# Patient Record
Sex: MALE | Race: WHITE | URBAN - METROPOLITAN AREA
[De-identification: names, ages, dates, MRNs, and addresses within clinical notes are randomized per-mention and may not be internally consistent; named-entity substitution may affect disease eponyms.]

---

## 2017-04-17 ENCOUNTER — OFFICE VISIT (OUTPATIENT)
Dept: SLEEP MEDICINE | Facility: CLINIC | Age: 27
End: 2017-04-17
Payer: COMMERCIAL

## 2017-04-17 VITALS
SYSTOLIC BLOOD PRESSURE: 115 MMHG | OXYGEN SATURATION: 97 % | HEART RATE: 75 BPM | HEIGHT: 71 IN | DIASTOLIC BLOOD PRESSURE: 74 MMHG | WEIGHT: 223 LBS | BODY MASS INDEX: 31.22 KG/M2

## 2017-04-17 DIAGNOSIS — R06.09 DOE (DYSPNEA ON EXERTION): ICD-10-CM

## 2017-04-17 DIAGNOSIS — R07.9 CHEST PAIN, UNSPECIFIED TYPE: Primary | ICD-10-CM

## 2017-04-17 PROCEDURE — 99205 OFFICE O/P NEW HI 60 MIN: CPT | Performed by: FAMILY MEDICINE

## 2017-04-17 RX ORDER — TRAZODONE HYDROCHLORIDE 50 MG/1
TABLET, FILM COATED ORAL
COMMUNITY
Start: 2017-04-03

## 2017-04-17 RX ORDER — BUPRENORPHINE HYDROCHLORIDE, NALOXONE HYDROCHLORIDE 8; 2 MG/1; MG/1
FILM, SOLUBLE BUCCAL; SUBLINGUAL
COMMUNITY
Start: 2017-04-05

## 2017-04-17 RX ORDER — GABAPENTIN 600 MG/1
TABLET ORAL
COMMUNITY
Start: 2017-04-15

## 2017-04-17 RX ORDER — PROPRANOLOL HYDROCHLORIDE 10 MG/1
TABLET ORAL
COMMUNITY
Start: 2017-04-16

## 2017-04-17 RX ORDER — BUPRENORPHINE HYDROCHLORIDE AND NALOXONE HYDROCHLORIDE DIHYDRATE 2; .5 MG/1; MG/1
TABLET SUBLINGUAL
COMMUNITY
Start: 2017-03-24

## 2017-04-17 RX ORDER — MIRTAZAPINE 45 MG/1
TABLET, FILM COATED ORAL
COMMUNITY
Start: 2017-04-05

## 2017-04-17 NOTE — MR AVS SNAPSHOT
After Visit Summary   4/17/2017    Vasquez Echevarria    MRN: [id number]           Patient Information     Date Of Birth          1990        Visit Information        Provider Department      4/17/2017 8:30 AM Ralph Almazan MD [facility]        Care Instructions    Here is a summary of our plan and considerations for Mr. Echevarria.    1.)  We will arrange an overnight oximetry (can be done at [facility]) to see if any severe oxygen desaturations.  [place] showed very mild KARLI.    2.)  For the chest pain / cardiac concerns, we will help arrange a transthoracic echocardiogram and try to get the records of his prior EKG to ensure no arrhythmias.    3.)  For his sleep medications, I agree with working on optimizing anxiety treatment.  For now, I would consider increasing the Trazodone to 100 or 150mg, consider increasing the melatonin to 10-15mg.  If felt to be appropriate, also consider starting a tricyclic anti-depressant like doxepin 3-25mg, amitriptyline or nortriptyline.    4.)  Plan to follow up for results of oximetry and echocardiogram.        Follow-ups after your visit        Who to contact     If you have questions or need follow up information about today's clinic visit or your schedule please contact [facility] directly at [phone].  Normal or non-critical lab and imaging results will be communicated to you by MyChart, letter or phone within 4 business days after the clinic has received the results. If you do not hear from us within 7 days, please contact the clinic through MyChart or phone. If you have a critical or abnormal lab result, we will notify you by phone as soon as possible.  Submit refill requests through [employer] or call your pharmacy and they will forward the refill request to us. Please allow 3 business days for your refill to be completed.          Additional Information About Your Visit        [employer]  "Information     Fingerprint lets you send messages to your doctor, view your test results, renew your prescriptions, schedule appointments and more. To sign up, go to [email or web address] . Click on \"Log in\" on the left side of the screen, which will take you to the Welcome page. Then click on \"Sign up Now\" on the right side of the page.     You will be asked to enter the access code listed below, as well as some personal information. Please follow the directions to create your username and password.     Your access code is: [email or web address]  Expires: 2017  9:37 AM     Your access code will  in 90 days. If you need help or a new code, please call your Nursery clinic or [phone].        Care EveryWhere ID     This is your Care EveryWhere ID. This could be used by other organizations to access your Nursery medical records  [id number]        Your Vitals Were     Pulse Height Pulse Oximetry BMI (Body Mass Index)          75 1.803 m (5' 11\") 97% 31.1 kg/m2         Blood Pressure from Last 3 Encounters:   17 115/74    Weight from Last 3 Encounters:   17 101.2 kg (223 lb)              Today, you had the following     No orders found for display       Primary Care Provider    None Specified       No primary provider on file.        Thank you!     Thank you for choosing [facility]  for your care. Our goal is always to provide you with excellent care. Hearing back from our patients is one way we can continue to improve our services. Please take a few minutes to complete the written survey that you may receive in the mail after your visit with us. Thank you!             Your Updated Medication List - Protect others around you: Learn how to safely use, store and throw away your medicines at www.disposemymeds.org.          This list is accurate as of: 17  9:37 AM.  Always use your most recent med list.                   Brand Name Dispense Instructions for use    gabapentin 600 " MG tablet    NEURONTIN         mirtazapine 45 MG tablet    REMERON         propranolol 10 MG tablet    INDERAL         * buprenorphine-naloxone 2-0.5 MG Subl sublingual tablet    SUBOXONE         * SUBOXONE 8-2 MG per film   Generic drug:  buprenorphine HCl-naloxone HCl          traZODone 50 MG tablet    DESYREL         VITAMIN C PO      Take 500 mg by mouth       VITAMIN D (CHOLECALCIFEROL) PO      Take 1,000 Units by mouth daily       * Notice:  This list has 2 medication(s) that are the same as other medications prescribed for you. Read the directions carefully, and ask your doctor or other care provider to review them with you.

## 2017-04-17 NOTE — PATIENT INSTRUCTIONS
Here is a summary of our plan and considerations for Mr. Echevarria.    1.)  We will arrange an overnight oximetry (can be done at [facility]) to see if any severe oxygen desaturations.  [place] showed very mild KARLI.    2.)  For the chest pain / cardiac concerns, we will help arrange a transthoracic echocardiogram and try to get the records of his prior EKG to ensure no arrhythmias.    3.)  For his sleep medications, I agree with working on optimizing anxiety treatment.  For now, I would consider increasing the Trazodone to 100 or 150mg, consider increasing the melatonin to 10-15mg.  If felt to be appropriate, also consider starting a tricyclic anti-depressant like doxepin 3-25mg, amitriptyline or nortriptyline.    4.)  Plan to follow up for results of oximetry and echocardiogram.

## 2017-04-17 NOTE — LETTER
"[facility]  [address]  [facility] [address]  [phone]  Dept: [phone]    2017      [facility]                    RE:  Vasquez Echevarria  MRN:  [id number]  :  1990  Encounter Date:  2017      Dear Dr. Duncan ref. provider found:     Thank you for consulting me today to see Vasquez Echevarria at the [facility]. Please find a copy of my notes for your record and review.      Thank you for sending him to our clinic.  I appreciate the opportunity to participate in patient's care.        Sincerely,    Ralph Almazan MD                            Sleep Consultation:    Date on this visit: 2017    Vasquez Echevarria is sent by [facility] for a sleep consultation regarding insomnia, history of KARLI, report of chest pain.    Primary Physician: No primary care provider on file.     CC: \"I think I need to see a cardiologist.\"    HPI:  Vasquez Echevarria is a pleasant and well-developed 27 yo M who presents under recommendation from his provides at the [facility].    Unfortunately, we did not receive any records with Vasquez, so all history recorded is provided by the patient.  We also were able to locate his prior PSG performed in [place], [place].    Vasquez feels his primary concern is \"heart issues\".  He is currently in treatment at [facility] for opiate and alcohol addiction, states he was on a phenobarbital detox taper.  Reports he has been in treatment multiple times before, primarily for opioid addiction.  Talks somewhat nonchalantly about prior overdoses with \"old school chinese opiates from the 60's\".  Denies abuse of stimulant medications in the past.    He reports having significant \"heart\" pain for the past 3 weeks, occurring in his left, right and central chest.  Described as being sharp to crushing in nature.  Does not seem to radiate.  Can feel SOB at these times, but seems to deny LYMAN and runs regularly.  He " reports he was sent to the ER by [facility] due to these concerns, normal lab work-up and reported normal EKG.  He has had this chest previously, seemed to resolve with anti-anxiety medication (he mentions benzodiazepines, zolpidem).  He denies any family history of heart disease.  He seems to recall having prior EKG's and likely an echocardiogram that were normal, done in [place].  He wants a stress test.  He is some agreement that uncontrolled anxiety is likely a major component of his symptoms.    He also reports a long history of insomnia, states has been treated with Zolpidem 10mg for 4-5 years, but also mentions being treated with a large number of other sedating medications:  - Zolpidem 10mg -> felt to be most helpful  - Temazepam  - Eszopiclone  - Doxepin  - various other benzodiazepines (lorazepam, clonazepam, alprazolam)  - Chloral hydrate    His current sleep-wake pattern is unclear to me today, states he is not sleeping well.  No known report of snoring or observed apnea.  Prior PSG's performed on 8/1/2010 with weight 184 lbs, AHI 10.6, leidia SpO2 ~89% and PAP titration PSG on 10/27/2010 with CPAP 8 optimal.    For his history of ADHD, reports being prescribed Adderall, Vyvanse.    For his anxiety / mental illness NOS, notes being prescribed:  - Viibryd  - Wellbutrin  - Cymbalta  - Venlafaxine  - Lamictal  - Seroquel  - Miratazepine  But, he is not sure if any took one long enough for benefit.    Vasquez denies any sleep walking and dream enactment.    Patient's [place] Sleepiness score 12/24 consistent with excessive  daytime sleepiness.      Current Medications (per patient):  Mirtazepine 45mg PO QHS  Trazodone 50mg PO QHS  Gabapentin 900mg PO BID to TID  Suboxone ?16 -> 12mg, has refused doses  Propranolol 10mg PO TID  Melatonin 3mg  Vitamin C  Vitamin D  ?muscle relaxant    Past Medical History:  ADHD  Anxiety  Poly substance abuse with overdose - primarily opiates  KARLI - mild    Family History:  Mother with  KARLI, anxiety, insomnia  Father with anxiety, insomnia    SHx:  From [place], [place] area.  Plans to stay in [place] following treatment, likely [place].    Allergies:    Allergies not on file    Medications:    No current outpatient prescriptions on file.       Problem List:  There are no active problems to display for this patient.       Past Medical/Surgical History:  No past medical history on file.  No past surgical history on file.    Social History:  Social History     Social History     Marital status: Single     Spouse name: N/A     Number of children: N/A     Years of education: N/A     Occupational History     Not on file.     Social History Main Topics     Smoking status: Not on file     Smokeless tobacco: Not on file     Alcohol use Not on file     Drug use: Not on file     Sexual activity: Not on file     Other Topics Concern     Not on file     Social History Narrative       Family History:  No family history on file.    Review of Systems:  A complete review of systems reviewed by me is negative with the exeption of what has been mentioned in the history of present illness.  CONSTITUTIONAL:  NEGATIVE  EYES: NEGATIVE for changes in vision, blind spots, double vision.  ENT: NEGATIVE for ear pain, sore throat, sinus pain, post-nasal drip, runny nose, bloody nose  CARDIAC:  POSITIVE for  chest pressure and breathlessness when lying flat  NEUROLOGIC:  POSITIVE for  headaches  DERMATOLOGIC: NEGATIVE for rashes, new moles or change in mole(s)  PULMONARY:  POSITIVE for  SOB at rest and SOB with activity  GASTROINTESTINAL: NEGATIVE for nausea or vomitting, loose or watery stools, fat or grease in stools, constipation, abdominal pain, bowel movements black in color or blood noted.  GENITOURINARY: NEGATIVE for pain during urination, blood in urine, urinating more frequently than usual, irregular menstrual periods.  MUSCULOSKELETAL: NEGATIVE for muscle pain, bone or joint pain, swollen joints.  ENDOCRINE: NEGATIVE for  "increased thirst or urination, diabetes.  LYMPHATIC: NEGATIVE for swollen lymph nodes, lumps or bumps in the breasts or nipple discharge.    Physical Examination:  Vitals: /74  Pulse 75  Ht 1.803 m (5' 11\")  Wt 101.2 kg (223 lb)  SpO2 97%  BMI 31.1 kg/m2  BMI= Body mass index is 31.1 kg/(m^2).    Neck Cir (cm): 43 cm    [place] Total Score 4/17/2017   Total score - [place] 12     GENERAL APPEARANCE: healthy, alert and no distress  RESP: lungs clear to auscultation - no rales, rhonchi or wheezes  CV: regular rates and rhythm, normal S1 S2, no S3 or S4 and no murmur, click or rub  PSYCH: mentation appears normal, affect normal/bright, anxious, tangential and speech somewhat pressured.    Impression/Plan:    Vasquez Echevarria is a pleasant and well-developed 27 yo M who presents under recommendation from his provides at the [facility].  Pertinent PMHx of polysubstance abuse (opiates, alcohol), severe anxiety, ADHD, mild KARLI.  Patient fixation on presence of heart disease.    1.)  Mild KARLI   -  8/1/2010 with weight 184 lbs, AHI 10.6, elidia SpO2 ~89% and PAP titration PSG on 10/27/2010 with CPAP 8 optimal   - Overall, I feel this mild KARLI is unlikely to a significant contributor to his sxs today   - Will check overnight oximetry on room air to rule out unlikely significant hypoxemia    2.)  Chronic insomnia   - Historically reports doing well with Zolpidem 10mg PO QHS, but also long-standing issues with polysubstance abuse and use of multiple sedative hypnotics   - While in treatment, pharmacotherapy options are limited.   - Would consider uptitrating Trazodone to 100-150mg, uptitrating melatonin to 10-15mg, consider TCA such as doxepin 3-25mg.    3.)  Chest pain   - Presumed non-cardiac, no known family history of early heart disease / sudden death / sudden LOC, excellent exercise tolerance   - Seems reasonable to ensure no structural heart disease with echocardiogram   - Patient is insistent on " a stress test, discussed if echo normal, would not see strong indication but theoretically could consider a treadmill stress EKG, though concerns are very low    Plan as given to patient as above.    I have spent 60 minutes with this patient today in which greater than 50% of this time was spent in the counseling / coordination of care regarding KARLI, insomnia, chest pain.    Polysomnography reviewed.  Obstructive sleep apnea reviewed.  Complications of untreated sleep apnea were reviewed.    Ralph Almazan MD    CC: No ref. provider found

## 2017-04-17 NOTE — PROGRESS NOTES
"  Sleep Consultation:    Date on this visit: 4/17/2017    Vasquez Echevarria is sent by [facility] for a sleep consultation regarding insomnia, history of KARLI, report of chest pain.    Primary Physician: No primary care provider on file.     CC: \"I think I need to see a cardiologist.\"    HPI:  Vasquez Ecehvarria is a pleasant and well-developed 25 yo M who presents under recommendation from his provides at the [facility].    Unfortunately, we did not receive any records with Vasquez, so all history recorded is provided by the patient.  We also were able to locate his prior PSG performed in [place], [place].    Vasquez feels his primary concern is \"heart issues\".  He is currently in treatment at [facility] reportedly for opiate and alcohol addiction, states he was on a phenobarbital detox taper.  Reports he has been in treatment multiple times before, primarily for opioid addiction.  Talks somewhat nonchalantly about prior overdoses with \"old school chinese opiates from the 60's\".  Denies abuse of stimulant medications in the past.    He reports having significant \"heart\" pain for the past 3 weeks, occurring in his left, right and central chest.  Described as being sharp to crushing in nature.  Does not seem to radiate.  Can feel SOB at these times, but seems to deny LYMAN and runs regularly.  He reports he was sent to the ER by [facility] due to these concerns, normal lab work-up and reported normal EKG.  He has had this chest previously, seemed to resolve with anti-anxiety medication (he mentions benzodiazepines, zolpidem).  He denies any family history of heart disease.  He seems to recall having prior EKG's and likely an echocardiogram that were normal, [place].  He wants a stress test.  He is some agreement that uncontrolled anxiety is likely a major component of his symptoms.    He also reports a long history of insomnia, states has been treated with Zolpidem 10mg for 4-5 years, but also mentions being treated " with a large number of other sedating medications:  - Zolpidem 10mg -> felt to be most helpful  - Temazepam  - Eszopiclone  - Doxepin  - various other benzodiazepines (lorazepam, clonazepam, alprazolam)  - Chloral hydrate    His current sleep-wake pattern is unclear to me today, states he is not sleeping well.  No known report of snoring or observed apnea.  Prior PSG's performed on 8/1/2010 with weight 184 lbs, AHI 10.6, elidia SpO2 ~89% and PAP titration PSG on 10/27/2010 with CPAP 8 optimal.    For his history of ADHD, reports being prescribed Adderall, Vyvanse.    For his anxiety / mental illness NOS, notes being prescribed:  - Viibryd  - Wellbutrin  - Cymbalta  - Venlafaxine  - Lamictal  - Seroquel  - Miratazepine  But, he is not sure if any took one long enough for benefit.    Vasquez denies any sleep walking and dream enactment.    Patient's [place] Sleepiness score 12/24 consistent with excessive  daytime sleepiness.      Current Medications (per patient):  Mirtazepine 45mg PO QHS  Trazodone 50mg PO QHS  Gabapentin 900mg PO BID to TID  Suboxone ?16 -> 12mg, has refused doses  Propranolol 10mg PO TID  Melatonin 3mg  Vitamin C  Vitamin D  ?muscle relaxant    Past Medical History:  ADHD  Anxiety  Poly substance abuse with overdose - primarily opiates  KARLI - mild    Family History:  Mother with KARLI, anxiety, insomnia  Father with anxiety, insomnia    SHx:  From [place], [place] area.  Plans to stay in [place] following treatment, likely [place].    Allergies:    Allergies not on file    Medications:    No current outpatient prescriptions on file.       Problem List:  There are no active problems to display for this patient.       Past Medical/Surgical History:  No past medical history on file.  No past surgical history on file.    Social History:  Social History     Social History     Marital status: Single     Spouse name: N/A     Number of children: N/A     Years of education: N/A     Occupational History     Not on  "file.     Social History Main Topics     Smoking status: Not on file     Smokeless tobacco: Not on file     Alcohol use Not on file     Drug use: Not on file     Sexual activity: Not on file     Other Topics Concern     Not on file     Social History Narrative       Family History:  No family history on file.    Review of Systems:  A complete review of systems reviewed by me is negative with the exeption of what has been mentioned in the history of present illness.  CONSTITUTIONAL:  NEGATIVE  EYES: NEGATIVE for changes in vision, blind spots, double vision.  ENT: NEGATIVE for ear pain, sore throat, sinus pain, post-nasal drip, runny nose, bloody nose  CARDIAC:  POSITIVE for  chest pressure and breathlessness when lying flat  NEUROLOGIC:  POSITIVE for  headaches  DERMATOLOGIC: NEGATIVE for rashes, new moles or change in mole(s)  PULMONARY:  POSITIVE for  SOB at rest and SOB with activity  GASTROINTESTINAL: NEGATIVE for nausea or vomitting, loose or watery stools, fat or grease in stools, constipation, abdominal pain, bowel movements black in color or blood noted.  GENITOURINARY: NEGATIVE for pain during urination, blood in urine, urinating more frequently than usual, irregular menstrual periods.  MUSCULOSKELETAL: NEGATIVE for muscle pain, bone or joint pain, swollen joints.  ENDOCRINE: NEGATIVE for increased thirst or urination, diabetes.  LYMPHATIC: NEGATIVE for swollen lymph nodes, lumps or bumps in the breasts or nipple discharge.    Physical Examination:  Vitals: /74  Pulse 75  Ht 1.803 m (5' 11\")  Wt 101.2 kg (223 lb)  SpO2 97%  BMI 31.1 kg/m2  BMI= Body mass index is 31.1 kg/(m^2).    Neck Cir (cm): 43 cm    [place] Total Score 4/17/2017   Total score - [place] 12     GENERAL APPEARANCE: healthy, alert and no distress  RESP: lungs clear to auscultation - no rales, rhonchi or wheezes  CV: regular rates and rhythm, normal S1 S2, no S3 or S4 and no murmur, click or rub  PSYCH: mentation appears normal, " affect normal/bright, anxious, tangential and speech somewhat pressured.    Impression/Plan:    Vasquez Echevarria is a pleasant and well-developed 25 yo M who presents under recommendation from his provides at the [facility].  Pertinent PMHx of polysubstance abuse (opiates, alcohol), severe anxiety, ADHD, mild KARLI.  Patient fixation on presence of heart disease.    1.)  Mild KARLI   -  8/1/2010 with weight 184 lbs, AHI 10.6, elidia SpO2 ~89% and PAP titration PSG on 10/27/2010 with CPAP 8 optimal   - Overall, I feel this mild KARLI is unlikely to a significant contributor to his sxs today   - Will check overnight oximetry on room air to rule out unlikely significant hypoxemia    2.)  Chronic insomnia   - Historically reports doing well with Zolpidem 10mg PO QHS, but also long-standing issues with polysubstance abuse and use of multiple sedative hypnotics   - While in treatment, pharmacotherapy options are limited.   - Would consider uptitrating Trazodone to 100-150mg, uptitrating melatonin to 10-15mg, consider TCA such as doxepin 3-25mg.    3.)  Chest pain   - Presumed non-cardiac, no known family history of early heart disease / sudden death / sudden LOC, excellent exercise tolerance   - Seems reasonable to ensure no structural heart disease with echocardiogram   - Patient is insistent on a stress test, discussed if echo normal, would not see strong indication but theoretically could consider a treadmill stress EKG, though concerns are very low    Plan as given to patient as above.    I have spent 60 minutes with this patient today in which greater than 50% of this time was spent in the counseling / coordination of care regarding KARLI, insomnia, chest pain.    Polysomnography reviewed.  Obstructive sleep apnea reviewed.  Complications of untreated sleep apnea were reviewed.    Ralph Almazan MD    CC: No ref. provider found

## 2017-04-17 NOTE — NURSING NOTE
"Chief Complaint   Patient presents with     Sleep Problem     Consult snoring, witnessed apneas, discuss KARLI treatment options       Initial /74  Pulse 75  Ht 1.803 m (5' 11\")  Wt 101.2 kg (223 lb)  SpO2 97%  BMI 31.1 kg/m2 Estimated body mass index is 31.1 kg/(m^2) as calculated from the following:    Height as of this encounter: 1.803 m (5' 11\").    Weight as of this encounter: 101.2 kg (223 lb).  Medication Reconciliation: complete    "

## 2017-04-20 ENCOUNTER — HOSPITAL ENCOUNTER (OUTPATIENT)
Dept: CARDIOLOGY | Facility: CLINIC | Age: 27
Discharge: HOME | End: 2017-04-20
Attending: FAMILY MEDICINE | Admitting: FAMILY MEDICINE
Payer: COMMERCIAL

## 2017-04-20 ENCOUNTER — OFFICE VISIT (OUTPATIENT)
Dept: SLEEP MEDICINE | Facility: CLINIC | Age: 27
End: 2017-04-20

## 2017-04-20 DIAGNOSIS — R06.09 DOE (DYSPNEA ON EXERTION): ICD-10-CM

## 2017-04-20 DIAGNOSIS — R07.9 CHEST PAIN, UNSPECIFIED TYPE: ICD-10-CM

## 2017-04-20 DIAGNOSIS — G47.33 OSA (OBSTRUCTIVE SLEEP APNEA): Primary | ICD-10-CM

## 2017-04-20 PROCEDURE — 93306 TTE W/DOPPLER COMPLETE: CPT

## 2017-04-20 PROCEDURE — 93306 TTE W/DOPPLER COMPLETE: CPT | Mod: 26 | Performed by: INTERNAL MEDICINE

## 2017-04-20 NOTE — MR AVS SNAPSHOT
After Visit Summary   4/20/2017    Vasquez Echevarria    MRN: [id number]           Patient Information     Date Of Birth          1990        Visit Information        Provider Department      4/20/2017 2:00 PM SLEEP LAB, BED FIVE [facility]        Today's Diagnoses     KARLI (obstructive sleep apnea)    -  1       Follow-ups after your visit        Your next 10 appointments already scheduled     Apr 20, 2017  3:00 PM CDT   Ech Complete with GORDON   [facility] ([facility])    [address]  [facility] [address]   [phone]           1.  Please bring or wear a comfortable two-piece outfit. 2.  You may eat, drink and take your normal medicines. 3.  For any questions that cannot be answered, please contact the ordering physician            Apr 21, 2017 11:30 AM CDT   Oximetry Drop Off with SLEEP LAB, BED FIVE   [facility] ([facility])    [address] [id number] [phone]            May 01, 2017 10:30 AM CDT   Return Sleep Patient with Ralph Almazan MD   [facility] ([facility])    [address] [address] [phone]              Who to contact     If you have questions or need follow up information about today's clinic visit or your schedule please contact [facility] directly at [phone].  Normal or non-critical lab and imaging results will be communicated to you by MyChart, letter or phone within 4 business days after the clinic has received the results. If you do not hear from us within 7 days, please contact the clinic through MyChart or phone. If you have a critical or abnormal lab result, we will notify you by phone as soon as possible.  Submit refill requests through [employer] or call your pharmacy and they will forward the refill request to us. Please allow 3 business days for your  "refill to be completed.          Additional Information About Your Visit        [email or web address] Information     [employer] lets you send messages to your doctor, view your test results, renew your prescriptions, schedule appointments and more. To sign up, go to [email or web address] . Click on \"Log in\" on the left side of the screen, which will take you to the Welcome page. Then click on \"Sign up Now\" on the right side of the page.     You will be asked to enter the access code listed below, as well as some personal information. Please follow the directions to create your username and password.     Your access code is: [email or web address]  Expires: 2017  9:37 AM     Your access code will  in 90 days. If you need help or a new code, please call your [place] clinic or [phone].        Care EveryWhere ID     This is your Care EveryWhere ID. This could be used by other organizations to access your [place] medical records  [id number]         Blood Pressure from Last 3 Encounters:   17 115/74    Weight from Last 3 Encounters:   17 101.2 kg (223 lb)              We Performed the Following     Overnight oximetry study        Primary Care Provider    None Specified       No primary provider on file.        Thank you!     Thank you for choosing [facility]  for your care. Our goal is always to provide you with excellent care. Hearing back from our patients is one way we can continue to improve our services. Please take a few minutes to complete the written survey that you may receive in the mail after your visit with us. Thank you!             Your Updated Medication List - Protect others around you: Learn how to safely use, store and throw away your medicines at www.disposemymeds.org.          This list is accurate as of: 17  2:19 PM.  Always use your most recent med list.                   Brand Name Dispense Instructions for use    gabapentin 600 MG tablet    NEURONTIN         " mirtazapine 45 MG tablet    REMERON         propranolol 10 MG tablet    INDERAL         * buprenorphine-naloxone 2-0.5 MG Subl sublingual tablet    SUBOXONE         * SUBOXONE 8-2 MG per film   Generic drug:  buprenorphine HCl-naloxone HCl          traZODone 50 MG tablet    DESYREL         VITAMIN C PO      Take 500 mg by mouth       VITAMIN D (CHOLECALCIFEROL) PO      Take 1,000 Units by mouth daily       * Notice:  This list has 2 medication(s) that are the same as other medications prescribed for you. Read the directions carefully, and ask your doctor or other care provider to review them with you.

## 2017-04-21 ENCOUNTER — DOCUMENTATION ONLY (OUTPATIENT)
Dept: SLEEP MEDICINE | Facility: CLINIC | Age: 27
End: 2017-04-21

## 2017-05-01 ENCOUNTER — OFFICE VISIT (OUTPATIENT)
Dept: SLEEP MEDICINE | Facility: CLINIC | Age: 27
End: 2017-05-01
Payer: COMMERCIAL

## 2017-05-01 VITALS
HEART RATE: 85 BPM | BODY MASS INDEX: 31.22 KG/M2 | HEIGHT: 71 IN | DIASTOLIC BLOOD PRESSURE: 80 MMHG | OXYGEN SATURATION: 97 % | WEIGHT: 223 LBS | SYSTOLIC BLOOD PRESSURE: 134 MMHG

## 2017-05-01 DIAGNOSIS — G47.33 OSA (OBSTRUCTIVE SLEEP APNEA): Primary | ICD-10-CM

## 2017-05-01 PROCEDURE — 99214 OFFICE O/P EST MOD 30 MIN: CPT | Performed by: FAMILY MEDICINE

## 2017-05-01 RX ORDER — TRAZODONE HYDROCHLORIDE 100 MG/1
100 TABLET ORAL
Qty: 30 TABLET | Refills: 3 | Status: SHIPPED | OUTPATIENT
Start: 2017-05-01

## 2017-05-01 NOTE — MR AVS SNAPSHOT
After Visit Summary   5/1/2017    Vasquez Echevarria    MRN: [id number]           Patient Information     Date Of Birth          1990        Visit Information        Provider Department      5/1/2017 10:30 AM Ralph Almazan MD [facility]        Today's Diagnoses     KARLI (obstructive sleep apnea)    -  1      Care Instructions      Your BMI is Body mass index is 31.1 kg/(m^2).  Weight management is a personal decision.  If you are interested in exploring weight loss strategies, the following discussion covers the approaches that may be successful. Body mass index (BMI) is one way to tell whether you are at a healthy weight, overweight, or obese. It measures your weight in relation to your height.  A BMI of 18.5 to 24.9 is in the healthy range. A person with a BMI of 25 to 29.9 is considered overweight, and someone with a BMI of 30 or greater is considered obese. More than two-thirds of American adults are considered overweight or obese.  Being overweight or obese increases the risk for further weight gain. Excess weight may lead to heart disease and diabetes.  Creating and following plans for healthy eating and physical activity may help you improve your health.  Weight control is part of healthy lifestyle and includes exercise, emotional health, and healthy eating habits. Careful eating habits lifelong are the mainstay of weight control. Though there are significant health benefits from weight loss, long-term weight loss with diet alone may be very difficult to achieve- studies show long-term success with dietary management in less than 10% of people. Attaining a healthy weight may be especially difficult to achieve in those with severe obesity. In some cases, medications, devices and surgical management might be considered.  What can you do?  If you are overweight or obese and are interested in methods for weight loss, you should discuss this with your provider.      Consider reducing daily calorie intake by 500 calories.     Keep a food journal.     Avoiding skipping meals, consider cutting portions instead.    Diet combined with exercise helps maintain muscle while optimizing fat loss. Strength training is particularly important for building and maintaining muscle mass. Exercise helps reduce stress, increase energy, and improves fitness. Increasing exercise without diet control, however, may not burn enough calories to loose weight.       Start walking three days a week 10-20 minutes at a time    Work towards walking thirty minutes five days a week     Eventually, increase the speed of your walking for 1-2 minutes at time    In addition, we recommend that you review healthy lifestyles and methods for weight loss available through the National Institutes of Health patient information sites:  http://win.niddk.nih.gov/publications/index.htm    And look into health and wellness programs that may be available through your health insurance provider, employer, local community center, or bandar club.    Weight management plan: Patient was referred to their PCP to discuss a diet and exercise plan.          Follow-ups after your visit        Follow-up notes from your care team     Return in about 4 weeks (around 5/29/2017).      Who to contact     If you have questions or need follow up information about today's clinic visit or your schedule please contact [facility] directly at [phone].  Normal or non-critical lab and imaging results will be communicated to you by MyChart, letter or phone within 4 business days after the clinic has received the results. If you do not hear from us within 7 days, please contact the clinic through MyChart or phone. If you have a critical or abnormal lab result, we will notify you by phone as soon as possible.  Submit refill requests through [employer] or call your pharmacy and they will forward the refill request to us. Please  "allow 3 business days for your refill to be completed.          Additional Information About Your Visit        MyChart Information     [employer] lets you send messages to your doctor, view your test results, renew your prescriptions, schedule appointments and more. To sign up, go to [email or web address] . Click on \"Log in\" on the left side of the screen, which will take you to the Welcome page. Then click on \"Sign up Now\" on the right side of the page.     You will be asked to enter the access code listed below, as well as some personal information. Please follow the directions to create your username and password.     Your access code is: [email or web address]  Expires: 2017  9:37 AM     Your access code will  in 90 days. If you need help or a new code, please call your [place] clinic or [phone].        Care EveryWhere ID     This is your Care EveryWhere ID. This could be used by other organizations to access your [place] medical records  [id number]        Your Vitals Were     Pulse Height Pulse Oximetry BMI (Body Mass Index)          85 1.803 m (5' 11\") 97% 31.1 kg/m2         Blood Pressure from Last 3 Encounters:   17 134/80   17 115/74    Weight from Last 3 Encounters:   17 101.2 kg (223 lb)   17 101.2 kg (223 lb)              We Performed the Following     Comprehensive DME          Today's Medication Changes          These changes are accurate as of: 17  3:54 PM.  If you have any questions, ask your nurse or doctor.               These medicines have changed or have updated prescriptions.        Dose/Directions    * traZODone 50 MG tablet   Commonly known as:  DESYREL   This may have changed:  Another medication with the same name was added. Make sure you understand how and when to take each.        Refills:  0       * traZODone 100 MG tablet   Commonly known as:  DESYREL   This may have changed:  You were already taking a medication with the same name, and this " prescription was added. Make sure you understand how and when to take each.        Dose:  100 mg   Take 1 tablet (100 mg) by mouth nightly as needed for sleep   Quantity:  30 tablet   Refills:  3       * Notice:  This list has 2 medication(s) that are the same as other medications prescribed for you. Read the directions carefully, and ask your doctor or other care provider to review them with you.         Where to get your medicines      Some of these will need a paper prescription and others can be bought over the counter.  Ask your nurse if you have questions.     Bring a paper prescription for each of these medications     traZODone 100 MG tablet                Primary Care Provider    None Specified       No primary provider on file.        Thank you!     Thank you for choosing [facility]  for your care. Our goal is always to provide you with excellent care. Hearing back from our patients is one way we can continue to improve our services. Please take a few minutes to complete the written survey that you may receive in the mail after your visit with us. Thank you!             Your Updated Medication List - Protect others around you: Learn how to safely use, store and throw away your medicines at www.disposemymeds.org.          This list is accurate as of: 5/1/17  3:54 PM.  Always use your most recent med list.                   Brand Name Dispense Instructions for use    gabapentin 600 MG tablet    NEURONTIN         mirtazapine 45 MG tablet    REMERON         propranolol 10 MG tablet    INDERAL         * buprenorphine-naloxone 2-0.5 MG Subl sublingual tablet    SUBOXONE     Reported on 5/1/2017       * SUBOXONE 8-2 MG per film   Generic drug:  buprenorphine HCl-naloxone HCl      Reported on 5/1/2017       * traZODone 50 MG tablet    DESYREL         * traZODone 100 MG tablet    DESYREL    30 tablet    Take 1 tablet (100 mg) by mouth nightly as needed for sleep       VITAMIN C PO      Take 500  mg by mouth       VITAMIN D (CHOLECALCIFEROL) PO      Take 1,000 Units by mouth daily       * Notice:  This list has 4 medication(s) that are the same as other medications prescribed for you. Read the directions carefully, and ask your doctor or other care provider to review them with you.

## 2017-05-01 NOTE — PROGRESS NOTES
Sleep Follow-Up Visit:    Date on this visit: 2017    Vasquez Echevarria comes in today for follow-up of overnight oximetry, echocardiogram given history of mild KARLI, recurrent chest pain.    2017 - Initial consultation from [facility] given history of KARLI, recurrent chest pain with normal cardiac work-up in ER (EKG, troponins).  Drugs of choice with opiates, alcohol.  Prior use of cocaine, methamphetamines, anabolic steroids, marijuana.  Co-morbid severe anxiety, ADHD.  Physical exam WNL.  Noted that chest pain has resolved historically with anti-anxiety medications.  Long history of insomnia that had responded to Zolpidem.  A/P for baseline echocardiogram, overnight oximetry on room air, consider increase in trazodone 50 -> 100 -> 100mg, consider increase in melatonin 10-15mg, and consider TCA such as doxepin 3-25mg.    Today - Returns for follow-up.  Echocardiogram performed 2017 and was largely WNL with LVEF 60-65%, normal RV systolic function, aortic valve appears bicuspid, mild aortic root dilatation and ascending aorta borderline dilated.  Overnight oximetry attempted x2, minimal data collected.  He notes his chest pain has self-resolved, feels it is due to anxiety.  Has graduated from [employer], staying in graduate program.  Unclear where he will head now, reports having a number of felonies in [place], so would be expensive to stay.  He continues to report daytime fatigue and overall poor sleep.  Trazodone was increased and then decreased to 50mg with trial of effexor, he has continued only the trazodone and mirtazepine 45mg PO QHS since discharge, suboxone was discontinued.    Past medical/surgical history, family history, social history, medications and allergies were reviewed.    Prior Sleep Testin2010 with weight 184 lbs, AHI 10.6, elidia SpO2 ~89% and PAP titration PSG on 10/27/2010 with CPAP 8 optimal    Problem List:  There are no active problems to display for this patient.        Impression/Plan:    1.) Mild KARLI  -  8/1/2010 with weight 184 lbs, AHI 10.6, elidia SpO2 ~89% and PAP titration PSG on 10/27/2010 with CPAP 8 optimal  - Given report of ongoing insomnia (with goal of reducing / minimizing centrally acting medication) and daytime fatigue, will treat with replacement CPAP auto-titrate 5-15 cm H2O    2.)  Chronic insomnia  - Historically reports doing well with Zolpidem 10mg PO QHS, but also long-standing issues with polysubstance abuse and use of multiple sedative hypnotics which is concerning for consideration of restarting Zolpidem.  - Watkins tart with uptitrating Trazodone to 100-150mg, consider TCA such as doxepin 3-25mg.    3.)  Bicuspid aortic valve, otherwise reassuring echocardiogram    He will follow up with me in about 1 month(s).     Twenty-five minutes spent with patient, all of which were spent face-to-face counseling, consulting, coordinating plan of care.      Ralph Almazan MD    CC: No primary care provider on file.

## 2017-05-01 NOTE — NURSING NOTE
"Chief Complaint   Patient presents with     Sleep Problem     Echo and overnight oximetry follow up       Initial /80  Pulse 85  Ht 1.803 m (5' 11\")  Wt 101.2 kg (223 lb)  SpO2 97%  BMI 31.1 kg/m2 Estimated body mass index is 31.1 kg/(m^2) as calculated from the following:    Height as of this encounter: 1.803 m (5' 11\").    Weight as of this encounter: 101.2 kg (223 lb).  Medication Reconciliation: complete    "

## 2017-05-02 NOTE — PROGRESS NOTES
SUBJECTIVE:  Chief Complaint   Patient presents with     Sleep Problem     nisa return       OBJECTIVE:  overnight pulse oximetry returned to clinic last week.     ASSESSMENT/PLAN:  Per provider, Dr. Almazan, testing failed. Did not get a good recording to obtain any results.

## 2017-05-10 ENCOUNTER — TELEPHONE (OUTPATIENT)
Dept: SLEEP MEDICINE | Facility: CLINIC | Age: 27
End: 2017-05-10

## 2017-05-10 DIAGNOSIS — F51.05 INSOMNIA DUE TO OTHER MENTAL DISORDER: Primary | ICD-10-CM

## 2017-05-10 DIAGNOSIS — F99 INSOMNIA DUE TO OTHER MENTAL DISORDER: Primary | ICD-10-CM

## 2017-05-10 NOTE — TELEPHONE ENCOUNTER
Patient calls requesting to re-start Zolpidem, he states that it is taking him 2-3 hours to fall asleep and he is leaving [facility] on 5/18 and is moving to [place] and staying in [place]. He is also requesting a sleep physician that is closer to him in [place] if you have any recommendations as he has no car to make it up here to see you. Please advise.    Isabel Doyle CMA

## 2017-05-11 RX ORDER — ZOLPIDEM TARTRATE 10 MG/1
TABLET ORAL
Qty: 30 TABLET | Refills: 1 | Status: SHIPPED | OUTPATIENT
Start: 2017-05-11

## 2017-05-11 NOTE — TELEPHONE ENCOUNTER
Called patient and he will  the prescription at our  on Thursday. He will google sleep centers once he knows his address.

## 2017-05-11 NOTE — TELEPHONE ENCOUNTER
I am hesitantly comfortable in restarting the Zolpidem.  Would plan to start with the 10mg tablet 1/2 - 1 PO QHS.  For [place] sleep clinic, I am not sure if the [facility] or [facility] would be closer?

## 2017-06-05 ENCOUNTER — TELEPHONE (OUTPATIENT)
Dept: SLEEP MEDICINE | Facility: CLINIC | Age: 27
End: 2017-06-05

## 2017-06-05 NOTE — TELEPHONE ENCOUNTER
ATTEMPTED TO CALL [facility] TO SCHEDULE CPAP SET UP APPOINTMENT, [facility] DIDN'T ANSWER AND I WAS UNABLE TO LEAVE MESSAGE, HIS VOICE MAIL NOT SET UP.

## 2024-01-03 NOTE — PATIENT INSTRUCTIONS
